# Patient Record
Sex: MALE | Race: ASIAN | NOT HISPANIC OR LATINO | URBAN - METROPOLITAN AREA
[De-identification: names, ages, dates, MRNs, and addresses within clinical notes are randomized per-mention and may not be internally consistent; named-entity substitution may affect disease eponyms.]

---

## 2020-02-28 ENCOUNTER — EMERGENCY (EMERGENCY)
Facility: HOSPITAL | Age: 40
LOS: 1 days | Discharge: HOME | End: 2020-02-28
Attending: EMERGENCY MEDICINE
Payer: COMMERCIAL

## 2020-02-28 VITALS
WEIGHT: 160.06 LBS | TEMPERATURE: 98 F | RESPIRATION RATE: 16 BRPM | OXYGEN SATURATION: 100 % | HEART RATE: 99 BPM | DIASTOLIC BLOOD PRESSURE: 84 MMHG | SYSTOLIC BLOOD PRESSURE: 130 MMHG

## 2020-02-28 DIAGNOSIS — S09.90XA UNSPECIFIED INJURY OF HEAD, INITIAL ENCOUNTER: ICD-10-CM
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
## 2020-02-28 DIAGNOSIS — R07.81 PLEURODYNIA: ICD-10-CM
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
## 2020-02-28 DIAGNOSIS — R20.2 PARESTHESIA OF SKIN: ICD-10-CM
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
## 2020-02-28 PROCEDURE — 73562 X-RAY EXAM OF KNEE 3: CPT | Mod: 26,LT

## 2020-02-28 PROCEDURE — 72125 CT NECK SPINE W/O DYE: CPT | Mod: 26

## 2020-02-28 PROCEDURE — 73590 X-RAY EXAM OF LOWER LEG: CPT

## 2020-02-28 PROCEDURE — 73590 X-RAY EXAM OF LOWER LEG: CPT | Mod: 26,LT

## 2020-02-28 PROCEDURE — 73630 X-RAY EXAM OF FOOT: CPT | Mod: 26,LT

## 2020-02-28 PROCEDURE — 71111 X-RAY EXAM RIBS/CHEST4/> VWS: CPT

## 2020-02-28 PROCEDURE — 71111 X-RAY EXAM RIBS/CHEST4/> VWS: CPT | Mod: 26

## 2020-02-28 PROCEDURE — 70450 CT HEAD/BRAIN W/O DYE: CPT

## 2020-02-28 PROCEDURE — 99284 EMERGENCY DEPT VISIT MOD MDM: CPT

## 2020-02-28 PROCEDURE — 73562 X-RAY EXAM OF KNEE 3: CPT

## 2020-02-28 PROCEDURE — 70450 CT HEAD/BRAIN W/O DYE: CPT | Mod: 26

## 2020-02-28 PROCEDURE — 72125 CT NECK SPINE W/O DYE: CPT

## 2020-02-28 PROCEDURE — 73610 X-RAY EXAM OF ANKLE: CPT

## 2020-02-28 PROCEDURE — 73630 X-RAY EXAM OF FOOT: CPT

## 2020-02-28 PROCEDURE — 99284 EMERGENCY DEPT VISIT MOD MDM: CPT | Mod: 25

## 2020-02-28 PROCEDURE — 73610 X-RAY EXAM OF ANKLE: CPT | Mod: 26,LT

## 2020-02-28 RX ORDER — IBUPROFEN 200 MG
1 TABLET ORAL
Qty: 12 | Refills: 0
Start: 2020-02-28 | End: 2020-03-01

## 2020-02-28 NOTE — ED ADULT NURSE NOTE - NSIMPLEMENTINTERV_GEN_ALL_ED
Implemented All Universal Safety Interventions:  [place] to call system. Call bell, personal items and telephone within reach. Instruct patient to call for assistance. Room bathroom lighting operational. Non-slip footwear when patient is off stretcher. Physically safe environment: no spills, clutter or unnecessary equipment. Stretcher in lowest position, wheels locked, appropriate side rails in place.

## 2020-02-28 NOTE — ED PROVIDER NOTE - MUSCULOSKELETAL MINIMAL EXAM
pain to left knee, left shin, left ankle, left foot, left rib. no focal tenderness and no bruising or deformity.

## 2020-02-28 NOTE — ED PROVIDER NOTE - CLINICAL SUMMARY MEDICAL DECISION MAKING FREE TEXT BOX
40 y/o M presents to ED with pain to multiple areas. Will give CT head and spine, rib X-ray and chest x-ray to patient's left lower extremity. Pt is refusing pain medications.
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
## 2020-02-28 NOTE — ED PROVIDER NOTE - NSFOLLOWUPINSTRUCTIONS_ED_ALL_ED_FT
Please return immediately to the ED for any new numbness, tingling, loss of sensation, new pain or any other concerning or worrisome sign or symptom.      Please follow-up with neurology ASAP and primary care provider in 1-2 days.
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
## 2020-02-28 NOTE — ED PROVIDER NOTE - PROGRESS NOTE DETAILS
Results of CTs and XRs reviewed with patient.  Patient still endorsing tingling sensation "like when novocaine starts to wear off" to the left side of his face, left side of tongue, lower lip and left arm.  On exam, cranial nerves intact including sensation when asked if touch sensation is equal on both sides of face.  Extensive conversation had with patient and provider for next steps in management - options being transfer to [facility] - trauma center for further consultation and workup including possible MRI vs outpatient follow-up.  Patient offered this transfer but patient refused and is instead requesting discharge and outpatient follow-up. Will provide discharge information and strict follow-up precautions provided to patient including any new numbness, tingling, loss of sensation, new pain or any other concerning or worrisome sign.  Patient also told to follow-up with neurologist and is agreeable to all.

## 2020-02-28 NOTE — ED PROVIDER NOTE - CROS ED NEURO ALL NEG
HEENT: mucosa dry no cyanosis  NECK: thick neck, accessory muscle use  CHEST: rales bilaterally with increased work of breathing, diminished sounds bilaterally.   CV: pulses intact, S1S2  ABD: pannus noted, soft, non rigid, no guarding.   SKIN: bilateral venous stasis ulcers noted with oozing wounds, erythematous and odorous.   EXT: deconditioned, grossly FROM  NEURO: AAO 3 no focal deficits, speech memory cognition and coordination grossly intact, limited exam due to habitus and comorbid disease burden - - -

## 2020-02-28 NOTE — ED PROVIDER NOTE - PHYSICAL EXAMINATION
After exam, patient started saying tingling returned to left side of face. Strength 5/5 in all extremities.

## 2020-02-28 NOTE — ED PROVIDER NOTE - PATIENT PORTAL LINK FT
You can access the FollowMyHealth Patient Portal offered by [facility] by registering at the following website: [email or web address]. By joining [employer]’s FollowMyHealth portal, you will also be able to view your health information using other applications (apps) compatible with our system.